# Patient Record
Sex: MALE | Race: WHITE | NOT HISPANIC OR LATINO | Employment: PART TIME | URBAN - METROPOLITAN AREA
[De-identification: names, ages, dates, MRNs, and addresses within clinical notes are randomized per-mention and may not be internally consistent; named-entity substitution may affect disease eponyms.]

---

## 2021-01-18 ENCOUNTER — HOSPITAL ENCOUNTER (EMERGENCY)
Facility: HOSPITAL | Age: 21
Discharge: HOME | End: 2021-01-18
Attending: EMERGENCY MEDICINE
Payer: OTHER MISCELLANEOUS

## 2021-01-18 VITALS
HEART RATE: 84 BPM | SYSTOLIC BLOOD PRESSURE: 125 MMHG | DIASTOLIC BLOOD PRESSURE: 85 MMHG | TEMPERATURE: 96 F | WEIGHT: 145 LBS | OXYGEN SATURATION: 99 % | RESPIRATION RATE: 20 BRPM

## 2021-01-18 DIAGNOSIS — S61.012A LACERATION OF LEFT THUMB: Primary | ICD-10-CM

## 2021-01-18 PROCEDURE — 99282 EMERGENCY DEPT VISIT SF MDM: CPT

## 2021-01-18 PROCEDURE — 99282 EMERGENCY DEPT VISIT SF MDM: CPT | Performed by: EMERGENCY MEDICINE

## 2021-01-18 PROCEDURE — 12001 RPR S/N/AX/GEN/TRNK 2.5CM/<: CPT | Performed by: EMERGENCY MEDICINE

## 2021-01-18 NOTE — Clinical Note
Mele Medley was seen and treated in our emergency department on 1/18/2021  Diagnosis:     Luster Aase  may return to work on return date  He may return on this date: 01/19/2021         If you have any questions or concerns, please don't hesitate to call        Tam Yanez RN    ______________________________           _______________          _______________  Hospital Representative                              Date                                Time

## 2021-01-19 NOTE — ED PROVIDER NOTES
History  Chief Complaint   Patient presents with    Finger Laceration     cut to L thumb while chopping lettuce at work       History provided by:  Patient  Finger Laceration  Location:  Finger  Finger laceration location:  L thumb  Length:  < 1 cm  Depth:  Cutaneous  Bleeding: venous and controlled    Time since incident:  30 minutes  Laceration mechanism:  Knife  Pain details:     Quality:  Aching and dull    Severity:  Mild    Timing:  Constant    Progression:  Improving  Foreign body present:  No foreign bodies  Relieved by:  None tried  Worsened by:  Nothing  Tetanus status:  Up to date  Associated symptoms: no fever, no focal weakness, no numbness, no rash, no redness, no swelling and no streaking        None       History reviewed  No pertinent past medical history  History reviewed  No pertinent surgical history  History reviewed  No pertinent family history  I have reviewed and agree with the history as documented  E-Cigarette/Vaping    E-Cigarette Use Never User      E-Cigarette/Vaping Substances     Social History     Tobacco Use    Smoking status: Never Smoker    Smokeless tobacco: Never Used   Substance Use Topics    Alcohol use: Not Currently    Drug use: Not Currently       Review of Systems   Constitutional: Negative for chills, fatigue and fever  HENT: Negative for congestion and sore throat  Eyes: Negative for visual disturbance  Respiratory: Negative for cough and shortness of breath  Cardiovascular: Negative for chest pain and palpitations  Gastrointestinal: Negative for abdominal pain, diarrhea, nausea and vomiting  Genitourinary: Negative for dysuria  Musculoskeletal: Negative for arthralgias, back pain and myalgias  Skin: Positive for wound  Negative for color change and rash  Neurological: Negative for dizziness, focal weakness and headaches  Hematological: Does not bruise/bleed easily  Psychiatric/Behavioral: The patient is nervous/anxious      All other systems reviewed and are negative  Physical Exam  Physical Exam  Vitals signs and nursing note reviewed  Constitutional:       General: He is not in acute distress  Appearance: He is well-developed  HENT:      Head: Normocephalic and atraumatic  Nose: Nose normal       Mouth/Throat:      Mouth: Mucous membranes are moist    Eyes:      Conjunctiva/sclera: Conjunctivae normal    Neck:      Musculoskeletal: Normal range of motion and neck supple  Cardiovascular:      Rate and Rhythm: Normal rate and regular rhythm  Pulmonary:      Effort: Pulmonary effort is normal  No respiratory distress  Breath sounds: Normal breath sounds  Abdominal:      General: There is no distension  Palpations: Abdomen is soft  Tenderness: There is no abdominal tenderness  Musculoskeletal:         General: Signs of injury present  No swelling, tenderness or deformity  Comments: ,1 cm linear superficial lac, good approximation, no active bleedinging, no c/c/c/e/l/a, no deformity, no crepitus  NV intact    Skin:     General: Skin is warm and dry  Capillary Refill: Capillary refill takes less than 2 seconds  Findings: No bruising  Neurological:      General: No focal deficit present  Mental Status: He is alert     Psychiatric:      Comments: anxious         Vital Signs  ED Triage Vitals [01/18/21 2139]   Temperature Pulse Respirations Blood Pressure SpO2   (!) 96 °F (35 6 °C) 84 20 125/85 99 %      Temp Source Heart Rate Source Patient Position - Orthostatic VS BP Location FiO2 (%)   Tympanic Monitor Sitting Right arm --      Pain Score       No Pain           Vitals:    01/18/21 2139   BP: 125/85   Pulse: 84   Patient Position - Orthostatic VS: Sitting         Visual Acuity      ED Medications  Medications - No data to display    Diagnostic Studies  Results Reviewed     None                 No orders to display              Procedures  Laceration repair    Date/Time: 1/18/2021 11:01 PM  Performed by: Idalmis Valdes MD  Authorized by: Idalmis Valdes MD   Consent: Verbal consent obtained  Risks and benefits: risks, benefits and alternatives were discussed  Patient understanding: patient states understanding of the procedure being performed  Body area: upper extremity  Location details: left thumb  Laceration length: 0 5 cm  Foreign bodies: no foreign bodies  Tendon involvement: none  Nerve involvement: none  Vascular damage: no    Sedation:  Patient sedated: no      Wound Dehiscence:  Superficial Wound Dehiscence: simple closure      Procedure Details:  Irrigation solution: tap water  Amount of cleaning: standard  Debridement: none  Degree of undermining: none  Skin closure: glue  Approximation: close  Approximation difficulty: simple               ED Course                                           MDM  Number of Diagnoses or Management Options  Laceration of left thumb: minor  Patient Progress  Patient progress: stable      Disposition  Final diagnoses:   Laceration of left thumb     Time reflects when diagnosis was documented in both MDM as applicable and the Disposition within this note     Time User Action Codes Description Comment    1/18/2021 10:13 PM Karly Daniels Add [[id number]] Laceration of left thumb       ED Disposition     ED Disposition Condition Date/Time Comment    Discharge Stable Mon Jan 18, 2021 10:13 PM Marshaarnel Blanco discharge to home/self care  Follow-up Information    None         There are no discharge medications for this patient  No discharge procedures on file      PDMP Review     None          ED Provider  Electronically Signed by           Idalmis Valdes MD  01/18/21 2480